# Patient Record
Sex: MALE | Race: WHITE | URBAN - METROPOLITAN AREA
[De-identification: names, ages, dates, MRNs, and addresses within clinical notes are randomized per-mention and may not be internally consistent; named-entity substitution may affect disease eponyms.]

---
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
## 2022-02-12 VITALS
SYSTOLIC BLOOD PRESSURE: 120 MMHG | OXYGEN SATURATION: 99 % | WEIGHT: 148 LBS | HEIGHT: 69 IN | HEART RATE: 101 BPM | BODY MASS INDEX: 21.92 KG/M2 | TEMPERATURE: 99.6 F | DIASTOLIC BLOOD PRESSURE: 70 MMHG

## 2022-02-15 NOTE — PROGRESS NOTES
Chief Complaint    Pt c/o cough, HA and fever 1 day.  History of Present Illness      Chief complaint as above reviewed and confirmed with patient.  Pt presents to the clinic with concerns re: 1 day hx of rhinorrhea, congestion, cough and HA. No fever documented at home but felt feverish.  Low grade fever in clinic without fever reducer. No vomiting or diarrhea. No rash.  Tolerating po fluids well.  No sob or difficulty breathing.       PMH: denies chronic medical problems other than uses flonase for allergies.  Review of Systems      Review of systems is negative with the exception of those noted in HPI          Physical Exam   Vitals & Measurements    T: 99.6   F (Tympanic)  HR: 101(Peripheral)  BP: 120/70  SpO2: 99%     HT: 69 in  WT: 148 lb  BMI: 21.85           Vitals as above per nursing documentation           Constitutional : nad appears well          Ears: ears patent B, TMS intact, noninjected           Nose: nasal mucosa is non-ededmatous. no discharge           Throat: pharynx is nonerythematous, no tonsillar hypertrophy, no exudate           Neck: neck supple, no adenopathy, no thyromegaly, no rigidity           Lungs: lungs CTA', no Wheezes, rhonchi or rales           Heart: heart RRR, nl S1, S2 no murmur           skin:  No rashes              Assessment/Plan       1. URI (upper respiratory infection) (J06.9)         observation, continue flonase.   Push fluids, rest and ibuprofen or tylenol for comfort.  Pt instructed to return to clinic for persistent or worsening symptoms.                           Orders:         fluticasone nasal, = 2 spray(s), Nasal, daily, # 16 gm, 11 Refill(s), Type: Maintenance, Pharmacy: [id number], 2 spray(s) Nasal daily, (Ordered)  Patient Information     Name:RADHA HARRIS      Address:      [id number] [place], [place] [id number]     Sex:Male     YOB: 2000     [phone]     Emergency Contact:[facility],  CONTACT     MRN:[id number]     FIN:[id number]     Location:[facility]     Date of Service:03/09/2020      Primary Care Physician:       NONE ,       Attending Physician:       Fredo ZHU, Karen MAIN, [phone]  Problem List/Past Medical History    Ongoing     No qualifying data    Historical     No qualifying data  Medications    Flonase 50 mcg/inh nasal spray, 2 spray(s), Nasal, daily, 11 refills  Allergies    No Known Medication Allergies  Social History    Smoking Status - 03/09/2020     Never smoker

## 2022-02-15 NOTE — NURSING NOTE
Comprehensive Intake Entered On:  3/9/2020 12:33 PM CDT    Performed On:  3/9/2020 12:29 PM CDT by Cat Carter               Summary   Chief Complaint :   Pt c/o cough, HA and fever 1 day.   Weight Measured :   148 lb(Converted to: 148 lb 0 oz, 67.13 kg)    Height Measured :   69 in(Converted to: 5 ft 9 in, 175.26 cm)    Body Mass Index :   21.85 kg/m2   Body Surface Area :   1.81 m2   Systolic Blood Pressure :   120 mmHg   Diastolic Blood Pressure :   70 mmHg   Mean Arterial Pressure :   87 mmHg   Peripheral Pulse Rate :   101 bpm (HI)    Temperature Tympanic :   99.6 DegF(Converted to: 37.6 DegC)    Oxygen Saturation :   99 %   Cat Carter - 3/9/2020 12:29 PM CDT   Health Status   Allergies Verified? :   Yes   Medication History Verified? :   Yes   Medical History Verified? :   Yes   Pre-Visit Planning Status :   Completed   Tobacco Use? :   Never smoker   Cat Carter - 3/9/2020 12:29 PM CDT   Meds / Allergies   (As Of: 3/9/2020 12:33:42 PM CDT)   Allergies (Active)   No Known Medication Allergies  Estimated Onset Date:   Unspecified ; Created By:   Cat Carter; Reaction Status:   Active ; Category:   Drug ; Substance:   No Known Medication Allergies ; Type:   Allergy ; Updated By:   Cat Carter; Reviewed Date:   3/9/2020 12:33 PM CDT        Medication List   (As Of: 3/9/2020 12:33:42 PM CDT)   No Known Home Medications     Cat Carter - 3/9/2020 12:33:24 PM

## 2022-02-15 NOTE — NURSING NOTE
CAGE Assessment Entered On:  3/10/2020 12:40 PM CDT    Performed On:  3/9/2020 12:40 PM CDT by Josiane Delgado               Assessment   Have you ever felt you should cut down on your drinking :   Yes   Have people annoyed you by criticizing your drinking :   No   Have you ever felt bad or guilty about your drinking :   Yes   Have you ever taken a drink first thing in the morning to steady your nerves or get rid of a hangover (Eye-opener) :   No   CAGE Score :   2    Josiane Delgado - 3/10/2020 12:40 PM CDT

## 2022-02-15 NOTE — NURSING NOTE
Depression Screening Entered On:  3/10/2020 12:40 PM CDT    Performed On:  3/9/2020 12:40 PM CDT by Josiane Delgado               Depression Screening   Little Interest - Pleasure in Activities :   Several days   Feeling Down, Depressed, Hopeless :   Not at all   Initial Depression Screen Score :   1    Trouble Falling or Staying Asleep :   Several days   Feeling Tired or Little Energy :   More than half the days   Poor Appetite or Overeating :   Several days   Feeling Bad About Yourself :   Not at all   Trouble Concentrating :   Not at all   Moving or Speaking Slowly :   Not at all   Thoughts Better Off Dead or Hurting Self :   Not at all   Detailed Depression Screen Score :   4    Total Depression Screen Score :   5    CANDI Difficulty with Work, Home, Others :   Somewhat difficult   Josiane Delgado - 3/10/2020 12:40 PM CDT

## 2022-05-13 ENCOUNTER — OFFICE VISIT (OUTPATIENT)
Dept: OCCUPATIONAL MEDICINE | Age: 22
End: 2022-05-13

## 2022-05-13 DIAGNOSIS — Z00.8 HEALTH EXAMINATION IN POPULATION SURVEY: ICD-10-CM

## 2022-05-13 PROCEDURE — OH035 DOT/N-DOT DS COLLECTION ONLY (ALL TYPES): Performed by: PHYSICIAN ASSISTANT

## 2023-04-21 ENCOUNTER — OFFICE VISIT (OUTPATIENT)
Dept: OCCUPATIONAL MEDICINE | Age: 23
End: 2023-04-21

## 2023-04-21 DIAGNOSIS — Z00.8 HEALTH EXAMINATION IN POPULATION SURVEY: ICD-10-CM

## 2023-04-21 PROCEDURE — OH035 DOT/N-DOT DS COLLECTION ONLY (ALL TYPES): Performed by: PHYSICIAN ASSISTANT

## 2024-12-06 ENCOUNTER — OFFICE VISIT (OUTPATIENT)
Dept: OCCUPATIONAL MEDICINE | Age: 24
End: 2024-12-06

## 2024-12-06 DIAGNOSIS — Z00.8 HEALTH EXAMINATION IN POPULATION SURVEY: Primary | ICD-10-CM

## 2024-12-06 PROCEDURE — OH046 BREATH ALCOHOL TEST BUNDLED: Performed by: PHYSICIAN ASSISTANT
